# Patient Record
Sex: MALE | Race: OTHER | NOT HISPANIC OR LATINO | Employment: FULL TIME | URBAN - METROPOLITAN AREA
[De-identification: names, ages, dates, MRNs, and addresses within clinical notes are randomized per-mention and may not be internally consistent; named-entity substitution may affect disease eponyms.]

---

## 2017-10-09 ENCOUNTER — TELEPHONE (OUTPATIENT)
Dept: URGENT CARE | Facility: CLINIC | Age: 37
End: 2017-10-09

## 2017-10-09 DIAGNOSIS — J06.9 URTI (ACUTE UPPER RESPIRATORY INFECTION): Primary | ICD-10-CM

## 2017-10-09 RX ORDER — DOXYCYCLINE HYCLATE 100 MG/1
CAPSULE ORAL
Qty: 20 CAPSULE | Refills: 0 | Status: SHIPPED | OUTPATIENT
Start: 2017-10-09 | End: 2018-09-12

## 2020-12-21 ENCOUNTER — TELEMEDICINE (OUTPATIENT)
Dept: FAMILY MEDICINE CLINIC | Facility: TELEHEALTH | Age: 40
End: 2020-12-21

## 2020-12-21 DIAGNOSIS — J40 BRONCHITIS: ICD-10-CM

## 2020-12-21 DIAGNOSIS — R06.2 WHEEZING: Primary | ICD-10-CM

## 2020-12-21 PROCEDURE — 99213 OFFICE O/P EST LOW 20 MIN: CPT | Performed by: NURSE PRACTITIONER

## 2020-12-21 RX ORDER — PREDNISONE 10 MG/1
TABLET ORAL
Qty: 20 TABLET | Refills: 0 | OUTPATIENT
Start: 2020-12-21 | End: 2021-05-27

## 2020-12-21 RX ORDER — DOXYCYCLINE HYCLATE 100 MG/1
100 CAPSULE ORAL 2 TIMES DAILY
Qty: 14 CAPSULE | Refills: 0 | Status: SHIPPED | OUTPATIENT
Start: 2020-12-21 | End: 2020-12-28

## 2020-12-21 NOTE — PROGRESS NOTES
CHIEF COMPLAINT  No chief complaint on file.        HPI  Ángel Leary is a 40 y.o. male  presents with complaint of worsening symptoms after completing a z pack and a 5 day course of prednisone 40 mg. Per day. He was seen at a [facility] and diagnosed with an URI last week. He denies known covid 19 exposure but he was tested and was negative at his [facility] visit. . He states he has a dry cough with occasional wheezing, shortness of breath when active, chills and tactile fever with mild sore throat and lymph node soreness/swelling. He is using Mucinex and a cool mist vaporizer for symptoms. He reports having a flu shot this season. He was not tested for the flu.     Review of Systems   Constitutional: Positive for activity change, appetite change, chills and fever (low grade).   HENT: Positive for congestion, postnasal drip, rhinorrhea and sore throat (mild sore throat). Negative for ear discharge, ear pain, facial swelling, sinus pressure and sinus pain.    Eyes: Negative.    Respiratory: Positive for cough, shortness of breath and wheezing.    Cardiovascular: Negative.    Gastrointestinal: Negative.    Skin: Negative.    Allergic/Immunologic: Negative.    Neurological: Negative for headaches.   Hematological: Positive for adenopathy (mild bilateral cervical lymph node swelling).   Psychiatric/Behavioral: Negative.        History reviewed. No pertinent past medical history.    History reviewed. No pertinent family history.    Social History     Socioeconomic History   • Marital status: Single     Spouse name: Not on file   • Number of children: Not on file   • Years of education: Not on file   • Highest education level: Not on file   Tobacco Use   • Smoking status: Current Every Day Smoker   • Smokeless tobacco: Never Used   Substance and Sexual Activity   • Alcohol use: Yes     Comment: OCCASS         There were no vitals taken for this visit.    PHYSICAL EXAM  Physical Exam   Constitutional: He is oriented to person,  place, and time. He appears well-developed and well-nourished. He has a sickly appearance. He appears ill. No distress.   HENT:   Head: Normocephalic and atraumatic.   Right Ear: Hearing and external ear normal.   Left Ear: Hearing and external ear normal.   Nose: Mucosal edema and rhinorrhea present. Right sinus exhibits no maxillary sinus tenderness and no frontal sinus tenderness. Left sinus exhibits no maxillary sinus tenderness and no frontal sinus tenderness.   Mouth/Throat: Mouth/Lips are normal.Mucous membranes are normal. No oropharyngeal exudate.   Nasal congestion with intermittent runny nose   Eyes: Conjunctivae are normal.   Pulmonary/Chest: No stridor.  No respiratory distress. He Audible wheeze noted...He exhibits no tenderness.   Lymphadenopathy:     He has cervical adenopathy (patient reports bilateral lymph node swelling and tenderness).   Neurological: He is alert and oriented to person, place, and time.   Psychiatric: He has a normal mood and affect.   Vitals reviewed.      Results for orders placed or performed during the hospital encounter of 12/16/20   COVID-19,[facility] ROUTINE, NP/OP SWAB IN TRANSPORT MEDIA OR ESWAB 72 HR TAT - Swab, Nasopharynx    Specimen: Nasopharynx; Swab   Result Value Ref Range    SARS-CoV-2, DON Not Detected Not Detected       Diagnoses and all orders for this visit:    1. Wheezing (Primary)  -     predniSONE (DELTASONE) 10 MG tablet; Take 4 tabs po for 2 days, 3 for 2 days, 2 for 2 days and 1 for 2 days then d/c.  Dispense: 20 tablet; Refill: 0    2. Bronchitis  -     doxycycline (VIBRAMYCIN) 100 MG capsule; Take 1 capsule by mouth 2 (Two) Times a Day for 7 days.  Dispense: 14 capsule; Refill: 0    continue Mucinex every 12 hours with a full glass of water.   Rest and increase fluids.   Albuterol HFA 2 inhalations tid for 5 days, then prn shortness of breath or wheezing.       FOLLOW-UP  As discussed during visit with PCP/[facility] if no improvement or Urgent  Care/Emergency Department if worsening of symptoms    Patient verbalizes understanding of medication dosage, comfort measures, instructions for treatment and follow-up.    Beckie Cordova, WILMER  12/21/2020  10:54 EST    This visit was performed via Telehealth.  This patient has been instructed to follow-up with their primary care provider if their symptoms worsen or the treatment provided does not resolve their illness.

## 2020-12-21 NOTE — PATIENT INSTRUCTIONS
Acute Bronchitis, Adult    Acute bronchitis is when air tubes in the lungs (bronchi) suddenly get swollen. The condition can make it hard for you to breathe. In adults, acute bronchitis usually goes away within 2 weeks. A cough caused by bronchitis may last up to 3 weeks. Smoking, allergies, and asthma can make the condition worse.  What are the causes?  This condition is caused by:  · Cold and flu viruses. The most common cause of this condition is the virus that causes the common cold.  · Bacteria.  · Substances that irritate the lungs, including:  ? Smoke from cigarettes and other types of tobacco.  ? Dust and pollen.  ? Fumes from chemicals, gases, or burned fuel.  ? Other materials that pollute indoor or outdoor air.  · Close contact with someone who has acute bronchitis.  What increases the risk?  The following factors may make you more likely to develop this condition:  · A weak body's defense system. This is also called the immune system.  · Any condition that affects your lungs and breathing, such as asthma.  What are the signs or symptoms?  Symptoms of this condition include:  · A cough.  · Coughing up clear, yellow, or green mucus.  · Wheezing.  · Chest congestion.  · Shortness of breath.  · A fever.  · Body aches.  · Chills.  · A sore throat.  How is this treated?  Acute bronchitis may go away over time without treatment. Your doctor may recommend:  · Drinking more fluids.  · Taking a medicine for a fever or cough.  · Using a device that gets medicine into your lungs (inhaler).  · Using a vaporizer or a humidifier. These are machines that add water or moisture in the air to help with coughing and poor breathing.  Follow these instructions at home:    Activity  · Get a lot of rest.  · Avoid places where there are fumes from chemicals.  · Return to your normal activities as told by your doctor. Ask your doctor what activities are safe for you.  Lifestyle  · Drink enough fluids to keep your pee (urine) pale  yellow.  · Do not drink alcohol.  · Do not use any products that contain nicotine or tobacco, such as cigarettes, e-cigarettes, and chewing tobacco. If you need help quitting, ask your doctor. Be aware that:  ? Your bronchitis will get worse if you smoke or breathe in other people's smoke (secondhand smoke).  ? Your lungs will heal faster if you quit smoking.  General instructions  · Take over-the-counter and prescription medicines only as told by your doctor.  · Use an inhaler, cool mist vaporizer, or humidifier as told by your doctor.  · Rinse your mouth often with salt water. To make salt water, dissolve ½-1 tsp (3-6 g) of salt in 1 cup (237 mL) of warm water.  · Keep all follow-up visits as told by your doctor. This is important.  How is this prevented?  To lower your risk of getting this condition again:  · Wash your hands often with soap and water. If soap and water are not available, use hand .  · Avoid contact with people who have cold symptoms.  · Try not to touch your mouth, nose, or eyes with your hands.  · Make sure to get the flu shot every year.  Contact a doctor if:  · Your symptoms do not get better in 2 weeks.  · You vomit more than once or twice.  · You have symptoms of loss of fluid from your body (dehydration). These include:  ? Dark urine.  ? Dry skin or eyes.  ? Increased thirst.  ? Headaches.  ? Confusion.  ? Muscle cramps.  Get help right away if:  · You cough up blood.  · You have chest pain.  · You have very bad shortness of breath.  · You become dehydrated.  · You faint or keep feeling like you are going to faint.  · You keep vomiting.  · You have a very bad headache.  · Your fever or chills get worse.  These symptoms may be an emergency. Do not wait to see if the symptoms will go away. Get medical help right away. Call your local emergency services (911 in the [place] Do not drive yourself to the hospital.  Summary  · Acute bronchitis is when air tubes in the lungs (bronchi)  suddenly get swollen. In adults, acute bronchitis usually goes away within 2 weeks.  · Take over-the-counter and prescription medicines only as told by your doctor.  · Drink enough fluid to keep your pee (urine) pale yellow.  · Contact a doctor if your symptoms do not improve after 2 weeks of treatment.  · Get help right away if you cough up blood, faint, or have chest pain or shortness of breath.  This information is not intended to replace advice given to you by your health care provider. Make sure you discuss any questions you have with your health care provider.  Document Revised: 07/10/2020 Document Reviewed: 07/10/2020  Elsevier Patient Education © 2020 [employer]

## 2022-08-14 ENCOUNTER — APPOINTMENT (OUTPATIENT)
Dept: GENERAL RADIOLOGY | Facility: HOSPITAL | Age: 42
End: 2022-08-14

## 2022-08-14 PROBLEM — J30.2 SEASONAL ALLERGIES: Status: ACTIVE | Noted: 2022-08-14

## 2022-08-14 PROCEDURE — 73630 X-RAY EXAM OF FOOT: CPT | Performed by: FAMILY MEDICINE

## 2022-11-11 ENCOUNTER — OFFICE VISIT (OUTPATIENT)
Dept: FAMILY MEDICINE CLINIC | Facility: CLINIC | Age: 42
End: 2022-11-11

## 2022-11-11 VITALS
TEMPERATURE: 97.7 F | SYSTOLIC BLOOD PRESSURE: 116 MMHG | HEIGHT: 72 IN | DIASTOLIC BLOOD PRESSURE: 72 MMHG | BODY MASS INDEX: 26.41 KG/M2 | OXYGEN SATURATION: 97 % | HEART RATE: 76 BPM | WEIGHT: 195 LBS

## 2022-11-11 DIAGNOSIS — Z00.00 ENCOUNTER FOR PREVENTIVE CARE: ICD-10-CM

## 2022-11-11 DIAGNOSIS — R73.01 IMPAIRED FASTING GLUCOSE: ICD-10-CM

## 2022-11-11 DIAGNOSIS — J45.909 ASTHMA DUE TO SEASONAL ALLERGIES: ICD-10-CM

## 2022-11-11 DIAGNOSIS — Z00.00 ANNUAL PHYSICAL EXAM: Primary | ICD-10-CM

## 2022-11-11 DIAGNOSIS — T14.8XXA BRUISE: ICD-10-CM

## 2022-11-11 DIAGNOSIS — K21.9 GASTROESOPHAGEAL REFLUX DISEASE, UNSPECIFIED WHETHER ESOPHAGITIS PRESENT: ICD-10-CM

## 2022-11-11 DIAGNOSIS — Z00.00 ENCOUNTER FOR MEDICAL EXAMINATION TO ESTABLISH CARE: ICD-10-CM

## 2022-11-11 PROCEDURE — 99396 PREV VISIT EST AGE 40-64: CPT | Performed by: STUDENT IN AN ORGANIZED HEALTH CARE EDUCATION/TRAINING PROGRAM

## 2022-11-11 PROCEDURE — 99213 OFFICE O/P EST LOW 20 MIN: CPT | Performed by: STUDENT IN AN ORGANIZED HEALTH CARE EDUCATION/TRAINING PROGRAM

## 2022-11-11 RX ORDER — ALBUTEROL SULFATE 90 UG/1
2 AEROSOL, METERED RESPIRATORY (INHALATION) EVERY 4 HOURS PRN
Qty: 8 G | Refills: 1 | Status: SHIPPED | OUTPATIENT
Start: 2022-11-11

## 2022-11-11 RX ORDER — PANTOPRAZOLE SODIUM 40 MG/1
40 TABLET, DELAYED RELEASE ORAL 2 TIMES DAILY PRN
Qty: 30 TABLET | Refills: 0 | Status: SHIPPED | OUTPATIENT
Start: 2022-11-11

## 2022-11-11 NOTE — PROGRESS NOTES
Chief Complaint  Pt presented to clinic with   Chief Complaint   Patient presents with   • Annual Exam          History of Present Illness  Mr. Neal 42-year-old male who presented to the clinic for the first time for establishing medical care and with multiple chief complaints.  Patient stated he has lost his mother recently and October and he does not know that his symptoms are related to that or not.  Patient stated he is actually dealing with chronic recurrent ear infection for more than a year and for that problem he has seen ENT, dentist but his problem has not been resolved.  Then he has bruise in his left leg which she does not know from where it has appeared.  And he has itching around that area only.  Then he has on and off pain which include left side of the neck pain left side of the chest pain and lower abdominal pain.  Patient said recently he went to the urgent care for that problem and he was given Maalox and lidocaine solution combination and after that his pain resolved  Review of Systems   Constitutional: Negative for appetite change, fatigue and unexpected weight change.   HENT: Positive for ear pain. Negative for congestion, rhinorrhea, sore throat, trouble swallowing and voice change.    Respiratory: Negative for chest tightness, shortness of breath and wheezing.    Cardiovascular: Positive for chest pain. Negative for palpitations.   Gastrointestinal: Positive for abdominal pain. Negative for abdominal distention, diarrhea, nausea and vomiting.   Genitourinary: Negative for decreased urine volume and flank pain.   Musculoskeletal: Negative for arthralgias and myalgias.   Skin: Negative for wound.   Neurological: Negative for tremors, weakness and headaches.   Hematological: Negative for adenopathy.   Psychiatric/Behavioral: Negative for dysphoric mood and sleep disturbance.       PMH:   Outpatient Medications Prior to Visit   Medication Sig Dispense Refill   • fluticasone (FLONASE) 50  "MCG/ACT nasal spray 2 sprays into the nostril(s) as directed by provider Daily for 10 days. 11.1 mL 0     No facility-administered medications prior to visit.      Allergies   Allergen Reactions   • Penicillins Rash     History reviewed. No pertinent surgical history.  family history includes Cancer in his father and maternal grandfather; No Known Problems in his brother, cousin, daughter, maternal grandmother, mother, paternal grandfather, paternal grandmother, sister, son, and another family member.   reports that he has been smoking cigarettes. He started smoking about 23 years ago. He has a 30.00 pack-year smoking history. He has never used smokeless tobacco. He reports current alcohol use. He reports that he does not use drugs.     /72   Pulse 76   Temp 97.7 °F (36.5 °C)   Ht 182.9 cm (72.01\")   Wt 88.5 kg (195 lb)   SpO2 97%   BMI 26.44 kg/m²   Physical Exam  HENT:      Head: Normocephalic and atraumatic.      Mouth/Throat:      Mouth: Mucous membranes are moist.      Pharynx: Oropharynx is clear.   Eyes:      Extraocular Movements: Extraocular movements intact.      Conjunctiva/sclera: Conjunctivae normal.      Pupils: Pupils are equal, round, and reactive to light.   Cardiovascular:      Rate and Rhythm: Normal rate and regular rhythm.   Pulmonary:      Effort: Pulmonary effort is normal.      Breath sounds: Normal breath sounds.   Abdominal:      General: Bowel sounds are normal.      Palpations: Abdomen is soft.   Musculoskeletal:         General: Normal range of motion.      Cervical back: Neck supple.   Skin:     General: Skin is warm.      Capillary Refill: Capillary refill takes less than 2 seconds.   Neurological:      General: No focal deficit present.      Mental Status: He is alert and oriented to person, place, and time. Mental status is at baseline.   Psychiatric:         Mood and Affect: Mood normal.          Diagnoses and all orders for this visit:    1. Annual physical exam " (Primary)  -     CBC Auto Differential  -     Comprehensive Metabolic Panel  -     Lipid Panel With / Chol / HDL Ratio  -     TSH  -     Vitamin D,25-Hydroxy  -     Hemoglobin A1c    2. Impaired fasting glucose  -     Hemoglobin A1c    3. Gastroesophageal reflux disease, unspecified whether esophagitis present  -     pantoprazole (PROTONIX) 40 MG EC tablet; Take 1 tablet by mouth 2 (Two) Times a Day As Needed (acid reflex). Empty stomach half an hour before meal  Dispense: 30 tablet; Refill: 0    4. Encounter for medical examination to establish care    5. Encounter for preventive care    6. Bruise  Comments:  Advised patient to monitor now, will recheck in 2 weeks    Labs have been ordered, will follow up    Preventive care  Encourage patient to get flu vaccine and Tdap shot.  Patient will think about it in the next visit      Needs yearly Flu vaccine  Dental visit and exam every year  Seat Belt always when driving or riding cars  Safe sex life to avoid STD  Healthy heart diet  Exercise 3 times a week    Follow Up  2 week

## 2022-11-17 LAB
25(OH)D3+25(OH)D2 SERPL-MCNC: 20.9 NG/ML (ref 30–100)
ALBUMIN SERPL-MCNC: 4.5 G/DL (ref 3.5–5.2)
ALBUMIN/GLOB SERPL: 2.6 G/DL
ALP SERPL-CCNC: 85 U/L (ref 39–117)
ALT SERPL-CCNC: 12 U/L (ref 1–41)
AST SERPL-CCNC: 14 U/L (ref 1–40)
BASOPHILS # BLD AUTO: 0.12 10*3/MM3 (ref 0–0.2)
BASOPHILS NFR BLD AUTO: 1.8 % (ref 0–1.5)
BILIRUB SERPL-MCNC: 0.3 MG/DL (ref 0–1.2)
BUN SERPL-MCNC: 7 MG/DL (ref 6–20)
BUN/CREAT SERPL: 7.4 (ref 7–25)
CALCIUM SERPL-MCNC: 8.9 MG/DL (ref 8.6–10.5)
CHLORIDE SERPL-SCNC: 102 MMOL/L (ref 98–107)
CHOLEST SERPL-MCNC: 178 MG/DL (ref 0–200)
CHOLEST/HDLC SERPL: 3.24 {RATIO}
CO2 SERPL-SCNC: 27.5 MMOL/L (ref 22–29)
CREAT SERPL-MCNC: 0.95 MG/DL (ref 0.76–1.27)
EGFRCR SERPLBLD CKD-EPI 2021: 103.8 ML/MIN/1.73
EOSINOPHIL # BLD AUTO: 0.57 10*3/MM3 (ref 0–0.4)
EOSINOPHIL NFR BLD AUTO: 8.3 % (ref 0.3–6.2)
ERYTHROCYTE [DISTWIDTH] IN BLOOD BY AUTOMATED COUNT: 12.1 % (ref 12.3–15.4)
GLOBULIN SER CALC-MCNC: 1.7 GM/DL
GLUCOSE SERPL-MCNC: 100 MG/DL (ref 65–99)
HBA1C MFR BLD: 5.2 % (ref 4.8–5.6)
HCT VFR BLD AUTO: 41.8 % (ref 37.5–51)
HDLC SERPL-MCNC: 55 MG/DL (ref 40–60)
HGB BLD-MCNC: 14.3 G/DL (ref 13–17.7)
IMM GRANULOCYTES # BLD AUTO: 0.01 10*3/MM3 (ref 0–0.05)
IMM GRANULOCYTES NFR BLD AUTO: 0.1 % (ref 0–0.5)
LDLC SERPL CALC-MCNC: 109 MG/DL (ref 0–100)
LYMPHOCYTES # BLD AUTO: 3.18 10*3/MM3 (ref 0.7–3.1)
LYMPHOCYTES NFR BLD AUTO: 46.4 % (ref 19.6–45.3)
MCH RBC QN AUTO: 31.6 PG (ref 26.6–33)
MCHC RBC AUTO-ENTMCNC: 34.2 G/DL (ref 31.5–35.7)
MCV RBC AUTO: 92.5 FL (ref 79–97)
MONOCYTES # BLD AUTO: 0.54 10*3/MM3 (ref 0.1–0.9)
MONOCYTES NFR BLD AUTO: 7.9 % (ref 5–12)
NEUTROPHILS # BLD AUTO: 2.43 10*3/MM3 (ref 1.7–7)
NEUTROPHILS NFR BLD AUTO: 35.5 % (ref 42.7–76)
NRBC BLD AUTO-RTO: 0 /100 WBC (ref 0–0.2)
PLATELET # BLD AUTO: 238 10*3/MM3 (ref 140–450)
POTASSIUM SERPL-SCNC: 4.3 MMOL/L (ref 3.5–5.2)
PROT SERPL-MCNC: 6.2 G/DL (ref 6–8.5)
RBC # BLD AUTO: 4.52 10*6/MM3 (ref 4.14–5.8)
SODIUM SERPL-SCNC: 137 MMOL/L (ref 136–145)
TRIGL SERPL-MCNC: 73 MG/DL (ref 0–150)
TSH SERPL DL<=0.005 MIU/L-ACNC: 1.04 UIU/ML (ref 0.27–4.2)
VLDLC SERPL CALC-MCNC: 14 MG/DL (ref 5–40)
WBC # BLD AUTO: 6.85 10*3/MM3 (ref 3.4–10.8)

## 2022-11-22 ENCOUNTER — OFFICE VISIT (OUTPATIENT)
Dept: FAMILY MEDICINE CLINIC | Facility: CLINIC | Age: 42
End: 2022-11-22

## 2022-11-22 VITALS
DIASTOLIC BLOOD PRESSURE: 70 MMHG | SYSTOLIC BLOOD PRESSURE: 110 MMHG | TEMPERATURE: 97.8 F | HEART RATE: 72 BPM | WEIGHT: 194.2 LBS | OXYGEN SATURATION: 98 % | BODY MASS INDEX: 26.3 KG/M2 | HEIGHT: 72 IN

## 2022-11-22 DIAGNOSIS — E55.9 VITAMIN D DEFICIENCY: ICD-10-CM

## 2022-11-22 DIAGNOSIS — F41.0 ANXIETY ATTACK: ICD-10-CM

## 2022-11-22 DIAGNOSIS — R07.9 RECURRENT CHEST PAIN: Primary | ICD-10-CM

## 2022-11-22 PROCEDURE — 99214 OFFICE O/P EST MOD 30 MIN: CPT | Performed by: STUDENT IN AN ORGANIZED HEALTH CARE EDUCATION/TRAINING PROGRAM

## 2022-11-22 RX ORDER — ESCITALOPRAM OXALATE 5 MG/1
5 TABLET ORAL DAILY
Qty: 90 TABLET | Refills: 1 | Status: SHIPPED | OUTPATIENT
Start: 2022-11-22

## 2022-11-22 RX ORDER — ERGOCALCIFEROL 1.25 MG/1
50000 CAPSULE ORAL WEEKLY
Qty: 12 CAPSULE | Refills: 0 | Status: SHIPPED | OUTPATIENT
Start: 2022-11-22

## 2022-11-22 NOTE — PROGRESS NOTES
"Chief Complaint  Follow-up (Labs )    Subjective        Ángel Leary presents to [facility]  History of Present Illness  For follow-up on labs and recurrent chest pain.  Patient stated he is still getting recurrent chest pain.  Chest pain are on and off in nature, can happen while he is working, resting, lying down position, walking and patient does not think it is related to any particular situation.  Pain stays there for few minutes to half an hour or more.  Patient stated while coming to this office suddenly he developed chest pain and while he was having EKG his chest pain resolved.  In the last visit patient was given PPIs to be taken daily which has not improved his chest pains.  Patient stated last time before this episode when he had chest pain he used albuterol inhaler and after 5 to 10 minutes of using the inhaler his chest pain resolved but he cannot say that was because of using inhaler or it just resolved by itself.  Patient has lost his mother recently but he feels like his emotions are appropriate to the situation.  He does not feel like he is depressed or his chest pain is related to any stress situations.  Chest pain are not associated with any exacerbating or relieving factors.  Review of system is negative for fever, headache, shortness of breath, palpitation, nausea, vomiting, any recent change in bladder habits.        Objective   Vital Signs:  /70   Pulse 72   Temp 97.8 °F (36.6 °C)   Ht 182.9 cm (72.01\")   Wt 88.1 kg (194 lb 3.2 oz)   SpO2 98%   BMI 26.33 kg/m²   Estimated body mass index is 26.33 kg/m² as calculated from the following:    Height as of this encounter: 182.9 cm (72.01\").    Weight as of this encounter: 88.1 kg (194 lb 3.2 oz).          Physical Exam  HENT:      Head: Normocephalic and atraumatic.      Mouth/Throat:      Mouth: Mucous membranes are moist.      Pharynx: Oropharynx is clear.   Eyes:      Extraocular Movements: " Extraocular movements intact.      Conjunctiva/sclera: Conjunctivae normal.      Pupils: Pupils are equal, round, and reactive to light.   Cardiovascular:      Rate and Rhythm: Normal rate and regular rhythm.   Pulmonary:      Effort: Pulmonary effort is normal.      Breath sounds: Normal breath sounds.   Abdominal:      General: Bowel sounds are normal.      Palpations: Abdomen is soft.   Musculoskeletal:         General: Normal range of motion.      Cervical back: Neck supple.   Skin:     General: Skin is warm.      Capillary Refill: Capillary refill takes less than 2 seconds.   Neurological:      General: No focal deficit present.      Mental Status: He is alert and oriented to person, place, and time. Mental status is at baseline.   Psychiatric:         Mood and Affect: Mood normal.        Result Review :    CMP    CMP 11/17/22   Glucose 100 (A)   BUN 7   Creatinine 0.95   Sodium 137   Potassium 4.3   Chloride 102   Calcium 8.9   Total Protein 6.2   Albumin 4.50   Globulin 1.7   Total Bilirubin 0.3   Alkaline Phosphatase 85   AST (SGOT) 14   ALT (SGPT) 12   (A) Abnormal value            CBC    CBC 11/17/22   WBC 6.85   RBC 4.52   Hemoglobin 14.3   Hematocrit 41.8   MCV 92.5   MCH 31.6   MCHC 34.2   RDW 12.1 (A)   Platelets 238   (A) Abnormal value            Lipid Panel    Lipid Panel 11/17/22   Total Cholesterol 178   Triglycerides 73   HDL Cholesterol 55   VLDL Cholesterol 14   LDL Cholesterol  109 (A)   (A) Abnormal value       Comments are available for some flowsheets but are not being displayed.           TSH    TSH 11/17/22   TSH 1.040                     Assessment and Plan   Diagnoses and all orders for this visit:    1. Recurrent chest pain (Primary)  -     Ambulatory Referral to Cardiology    2. Vitamin D deficiency  -     vitamin D (ERGOCALCIFEROL) 1.25 MG (81871 UT) capsule capsule; Take 1 capsule by mouth 1 (One) Time Per Week.  Dispense: 12 capsule; Refill: 0    3. Anxiety attack  -      escitalopram (Lexapro) 5 MG tablet; Take 1 tablet by mouth Daily.  Dispense: 90 tablet; Refill: 1    Recurrent chest pain  Cardiology referral given to rule out any cardiac related chest pain  Also started on Lexapro thinking may be patient chest pains are related with anxiety  EKG done today, showed sinus rhythm and nonspecific findings      Labs reviewed, patient vitamin D level is 20, advised patient to take vitamin D once a week pill for 12 weeks and after that patient can take over-the-counter vitamin D daily pills    While patient was getting EKG patient chest pain resolved by itself, patient is advised to go to ER immediately if chest pain recurs.  Patient was explained in order to rule out completely cardiac related chest pain patient need to be observed for few hours with EKG and blood work-up so in case if he develops another episode he definitely need to go to ER for further evaluation.  Patient agrees and showed verbalized understanding       Follow Up   No follow-ups on file.  Patient was given instructions and counseling regarding his condition or for health maintenance advice. Please see specific information pulled into the AVS if appropriate.

## 2022-12-02 ENCOUNTER — TELEPHONE (OUTPATIENT)
Dept: CARDIOLOGY | Facility: CLINIC | Age: 42
End: 2022-12-02

## 2022-12-02 NOTE — TELEPHONE ENCOUNTER
I received a call from our  that this pt is trying to be seen by one of our cardiologist ASAP.  I tried to call the pt to triage what is going on with him, but there was no answer and I left a voicemail.    Per , pt will need a Friday appt.    Will continue to try to reach pt.  I asked him to call us back at the office so we can move forward.    Thank you,    Rylee Leiva, RN  Triage [facility] [place]  12/02/22 09:22 EST

## 2022-12-05 NOTE — TELEPHONE ENCOUNTER
I tried to call Ángel Leary but there was no answer.  Left a detailed voicemail asking patient to call back sp we can go over in detail what's been going on with him prior to his appt Friday with Dr. Hoover.      Thank you,    Rylee Leiva, RN  Triage [facility]  12/05/22 09:47 EST

## 2022-12-16 ENCOUNTER — OFFICE VISIT (OUTPATIENT)
Dept: CARDIOLOGY | Facility: CLINIC | Age: 42
End: 2022-12-16

## 2022-12-16 VITALS
HEART RATE: 63 BPM | DIASTOLIC BLOOD PRESSURE: 78 MMHG | SYSTOLIC BLOOD PRESSURE: 110 MMHG | WEIGHT: 199.6 LBS | BODY MASS INDEX: 27.04 KG/M2 | HEIGHT: 72 IN

## 2022-12-16 DIAGNOSIS — R07.89 CHEST PAIN, ATYPICAL: ICD-10-CM

## 2022-12-16 DIAGNOSIS — R00.2 PALPITATIONS: Primary | ICD-10-CM

## 2022-12-16 PROCEDURE — 93000 ELECTROCARDIOGRAM COMPLETE: CPT | Performed by: STUDENT IN AN ORGANIZED HEALTH CARE EDUCATION/TRAINING PROGRAM

## 2022-12-16 PROCEDURE — 99204 OFFICE O/P NEW MOD 45 MIN: CPT | Performed by: STUDENT IN AN ORGANIZED HEALTH CARE EDUCATION/TRAINING PROGRAM

## 2022-12-16 NOTE — PROGRESS NOTES
Subjective:     Encounter Date:2022      Patient ID: Ángel Leary is a 42 y.o. male.    Chief Complaint:  Chest pains    HPI:   42 y.o. male with no significant past medical history who presents for evaluation of chest pain.  Unfortunately, patient's mother  in late October.  About a week later when he returned home he began having very frequent severe chest pains.  At times they were associate with palpitations and he could feel his heartbeat in his ear.  Other times he felt bilateral foot numbness.  He denies any shortness of breath or dyspnea on exertion.  He also denies any lower extremity edema.  The chest pains would occur intermittently and were not associated with exertion, food.  They occurred at times at rest.  At times with walking.  He went to his PMD who prescribed as needed pantoprazole as well as Lexapro and an inhaler.  Patient has not noted any improvement with those therapies however he does note that over time the symptoms have been decreasing in frequency.      The following portions of the patient's history were reviewed and updated as appropriate: allergies, current medications, past family history, past medical history, past social history, past surgical history and problem list.     REVIEW OF SYSTEMS:   All systems reviewed.  Pertinent positives identified in HPI.  All other systems are negative.    Past Medical History:   Diagnosis Date   • Allergic 01   • Seasonal allergies        Family History   Problem Relation Age of Onset   • No Known Problems Mother    • Cancer Father    • No Known Problems Sister    • No Known Problems Brother    • No Known Problems Son    • No Known Problems Daughter    • No Known Problems Maternal Grandmother    • Cancer Maternal Grandfather    • No Known Problems Paternal Grandmother    • No Known Problems Paternal Grandfather    • No Known Problems Cousin    • No Known Problems Other        Social History     Socioeconomic History   •  Marital status: Single   Tobacco Use   • Smoking status: Every Day     Packs/day: 1.50     Years: 20.00     Pack years: 30.00     Types: Cigarettes     Start date: 11/8/1999   • Smokeless tobacco: Never   • Tobacco comments:     Caffeine use    Vaping Use   • Vaping Use: Never used   Substance and Sexual Activity   • Alcohol use: Yes     Comment: Rarely, but normally a beer.   • Drug use: Never   • Sexual activity: Yes     Partners: Female     Birth control/protection: None       Allergies   Allergen Reactions   • Penicillins Rash       History reviewed. No pertinent surgical history.      ECG 12 Lead    Date/Time: 12/16/2022 11:07 AM  Performed by: Vahe Lee MD  Authorized by: Vahe Lee MD   Comparison: not compared with previous ECG   Previous ECG: no previous ECG available  Rhythm: sinus rhythm  Rate: normal  Conduction: conduction normal  ST Segments: ST segments normal  T Waves: T waves normal  QRS axis: normal    Clinical impression: normal ECG               Objective:         Vitals:    12/16/22 1036   BP: 110/78   Pulse: 63       PHYSICAL EXAM:  GEN: well appearing, in NAD   HEENT: NCAT, EOMI, moist mucus membranes   Respiratory: CTAB, no wheezes, rales or rhonchi  CV: normal rate, regular rhythm, normal S1, S2, no murmurs, rubs, gallops, +2 radial pulses b/l  GI: soft, nontender, nondistended  MSK: no edema  Skin: no rash, warm, dry  Heme/Lymph: no bruising or bleeding  Psych: organized thought, normal behavior and affect  Neuro: Alert and Oriented x 3, grossly normal motor function        Assessment:         (R00.2) Palpitations - Plan: Holter Monitor - 72 Hour Up To 15 Days    (R07.89) Chest pain, atypical - Plan: Holter Monitor - 72 Hour Up To 15 Days, Adult Transthoracic Echo Complete w/ Color, Spectral and Contrast if Necessary Per Protocol    42-year-old man with no significant past medical history presents for evaluation of frequent chest pain after the death of his mother.       Plan:        #Chest pain  His pain is atypical and not consistent with CAD as it is nonexertional, not relieved with rest.  He also has no risk factor for CAD.  Given the recent death of his mother, stress cardiomyopathy since differential however he does not have other signs and symptoms of decreased LV function.  Given intermittent palpitations, its possible an arrhythmia is contributing to his symptoms as well.  - 2-week Zio patch for evaluation of arrhythmia  - Echocardiogram to eval for stress cardiomyopathy      Dr Worley,  thank you very much for referring this kind patient to me. Please call me with any questions or concerns. I will see the patient again in the office pending his testing results.         Vahe Lee MD  12/16/22  [facility]    Outpatient Encounter Medications as of 12/16/2022   Medication Sig Dispense Refill   • albuterol sulfate  (90 Base) MCG/ACT inhaler Inhale 2 puffs Every 4 (Four) Hours As Needed for Wheezing (Chest tightness). 8 g 1   • escitalopram (Lexapro) 5 MG tablet Take 1 tablet by mouth Daily. 90 tablet 1   • pantoprazole (PROTONIX) 40 MG EC tablet Take 1 tablet by mouth 2 (Two) Times a Day As Needed (acid reflex). Empty stomach half an hour before meal 30 tablet 0   • vitamin D (ERGOCALCIFEROL) 1.25 MG (42250 UT) capsule capsule Take 1 capsule by mouth 1 (One) Time Per Week. 12 capsule 0   • fluticasone (FLONASE) 50 MCG/ACT nasal spray 2 sprays into the nostril(s) as directed by provider Daily for 10 days. 11.1 mL 0     No facility-administered encounter medications on file as of 12/16/2022.

## 2023-01-06 ENCOUNTER — APPOINTMENT (OUTPATIENT)
Dept: CARDIOLOGY | Facility: HOSPITAL | Age: 43
End: 2023-01-06
Payer: COMMERCIAL

## 2023-01-06 ENCOUNTER — TELEPHONE (OUTPATIENT)
Dept: CARDIOLOGY | Facility: CLINIC | Age: 43
End: 2023-01-06
Payer: COMMERCIAL

## 2023-01-06 NOTE — TELEPHONE ENCOUNTER
Received call from Kim at [facility].    Ángel durbin has been approved.      Authorization number: [id number]  Effective: 1/6/23 and is good for 30 days    For any additional questions or concerns, please call [phone].    Thank you,  Juana Velez RN  Triage Nurse [facility]